# Patient Record
Sex: MALE | Race: WHITE | NOT HISPANIC OR LATINO
[De-identification: names, ages, dates, MRNs, and addresses within clinical notes are randomized per-mention and may not be internally consistent; named-entity substitution may affect disease eponyms.]

---

## 2022-08-16 ENCOUNTER — TRANSCRIPTION ENCOUNTER (OUTPATIENT)
Age: 37
End: 2022-08-16

## 2022-08-16 ENCOUNTER — APPOINTMENT (OUTPATIENT)
Dept: ORTHOPEDIC SURGERY | Facility: CLINIC | Age: 37
End: 2022-08-16

## 2022-08-16 VITALS — HEIGHT: 69 IN | BODY MASS INDEX: 27.4 KG/M2 | WEIGHT: 185 LBS

## 2022-08-16 DIAGNOSIS — M75.51 BURSITIS OF RIGHT SHOULDER: ICD-10-CM

## 2022-08-16 PROBLEM — Z00.00 ENCOUNTER FOR PREVENTIVE HEALTH EXAMINATION: Status: ACTIVE | Noted: 2022-08-16

## 2022-08-16 PROCEDURE — 99203 OFFICE O/P NEW LOW 30 MIN: CPT

## 2022-08-17 NOTE — PHYSICAL EXAM
[de-identified] : Right Shoulder:\par \par Constitutional:\par The patient is healthy-appearing and in no apparent distress. \par \par Cardiovascular System: \par The capillary refill is less than 2 seconds. \par \par Skin: \par There are no skin abnormalities.\par \par C-Spine/Neck:\par \par Active Range of Motion:\par Flexion				50\par Extension			60\par Lateral rotation			80  \par \par Right Shoulder: \par Inspection: \par There is no atrophy, erythema, warmth, swelling.\par There is no scapular winging.\par There is no AC prominence. \par \par Bony Palpation: \par There is no tenderness of the clavicle.\par There is no tenderness of the acromioclavicular joint.\par There is no tenderness of the greater tuberosity. \par There is no tenderness of the bicipital groove.\par  \par Soft Tissue Palpation: \par There is no tenderness of the trapezius.\par There is no tenderness of the rhomboid.\par There is no tenderness of the subacromial bursa. \par \par Active Range of Motion: \par Forward flexion- 				180 \par Abduction-					150\par External rotation at 0 degrees abduction-	80 \par Internal rotation at 0 degrees abduction-	80\par \par Passive Range of Motion: \par Forward flexion- 			180 \par Abduction-				150\par External rotation at 0 deg abduction-	80 \par Internal rotation at 0 deg abduction-	80\par \par Special Tests: \par Hawkin's  				Positive \par Neer's  				Positive \par Speed's  				Negative\par AC cross-over 			            Negative\par [place]'s  				Negative\par \par Stability: \par There is no general laxity. \par There is no anterior apprehension.\par \par Strength: \par Supraspinatus abduction 		5/5\par External rotation at 0 deg abduction 	5/5\par Internal rotation at 0 deg abduction	5/5\par Scapular elevation 			5/5 \par \par Neurological System: \par \par There is normal sensation to light touch C5-T1. \par \par Stability: \par There is no general laxity. \par \par Psychiatric: \par The patient demonstrates a normal mood and affect and is active and alert. [de-identified] : MRI as above.

## 2022-08-17 NOTE — ASSESSMENT
[FreeTextEntry1] : Discussed at length the patient MRI which is somewhat consistent with his MRI from 10 years ago.  Discussed symptoms most consistent with tendinitis of the shoulder rather than labral pathology.  Reviewed with him prior cervical MRI from 10 years ago which does reveal a prior disc herniation.  Treatment options reviewed and patient this time elects continued physical therapy and discuss if no improvement consideration to shot and ultimately if no improvement arthroscopic evaluation.

## 2022-08-17 NOTE — HISTORY OF PRESENT ILLNESS
[de-identified] : Location: Right shoulder\par Duration: 10 years - worse in the last few months\par Context: atraumatic\par Quality: dull, sore, fatigue\par Aggravating factors: lifting, overhead movements\par Associated symptoms: clicking\par Conservative treatment: N/A\par Prior studies: MRI 8/6/22 right shoulder [facility]- SLAP tear, mild cuff tendonitis

## 2022-11-30 ENCOUNTER — TRANSCRIPTION ENCOUNTER (OUTPATIENT)
Age: 37
End: 2022-11-30

## 2022-11-30 NOTE — ASU PATIENT PROFILE, ADULT - NSICDXPASTSURGICALHX_GEN_ALL_CORE_FT
PAST SURGICAL HISTORY:  H/O adenoidectomy     History of nasal septoplasty     History of tonsillectomy

## 2022-11-30 NOTE — ASU PATIENT PROFILE, ADULT - FALL HARM RISK - UNIVERSAL INTERVENTIONS
Bed in lowest position, wheels locked, appropriate side rails in place/Call bell, personal items and telephone in reach/Instruct patient to call for assistance before getting out of bed or chair/Non-slip footwear when patient is out of bed/[place] to call system/Physically safe environment - no spills, clutter or unnecessary equipment/Purposeful Proactive Rounding/Room/bathroom lighting operational, light cord in reach

## 2022-11-30 NOTE — ASU PATIENT PROFILE, ADULT - NS PREOP UNDERSTANDS INFO
No solid food or milk products after 12 mid-night 11/30/2022/ water or clear apple juice no later than 6:15am DOS/photo ID and insurance card/ comfortable clothing/ girlfriend will take him home/yes

## 2022-12-01 ENCOUNTER — RESULT REVIEW (OUTPATIENT)
Age: 37
End: 2022-12-01

## 2022-12-01 ENCOUNTER — OUTPATIENT (OUTPATIENT)
Dept: OUTPATIENT SERVICES | Facility: HOSPITAL | Age: 37
LOS: 1 days | Discharge: HOME | End: 2022-12-01

## 2022-12-01 ENCOUNTER — TRANSCRIPTION ENCOUNTER (OUTPATIENT)
Age: 37
End: 2022-12-01

## 2022-12-01 VITALS
RESPIRATION RATE: 17 BRPM | WEIGHT: 197.75 LBS | SYSTOLIC BLOOD PRESSURE: 120 MMHG | HEIGHT: 69 IN | TEMPERATURE: 98 F | DIASTOLIC BLOOD PRESSURE: 80 MMHG | OXYGEN SATURATION: 98 % | HEART RATE: 84 BPM

## 2022-12-01 VITALS
OXYGEN SATURATION: 99 % | HEART RATE: 80 BPM | RESPIRATION RATE: 12 BRPM | SYSTOLIC BLOOD PRESSURE: 130 MMHG | DIASTOLIC BLOOD PRESSURE: 90 MMHG

## 2022-12-01 DIAGNOSIS — Z98.890 OTHER SPECIFIED POSTPROCEDURAL STATES: Chronic | ICD-10-CM

## 2022-12-01 DIAGNOSIS — Z90.89 ACQUIRED ABSENCE OF OTHER ORGANS: Chronic | ICD-10-CM

## 2022-12-01 PROCEDURE — 88304 TISSUE EXAM BY PATHOLOGIST: CPT | Mod: 26

## 2022-12-01 PROCEDURE — 88311 DECALCIFY TISSUE: CPT | Mod: 26

## 2022-12-01 PROCEDURE — 88305 TISSUE EXAM BY PATHOLOGIST: CPT | Mod: 26

## 2022-12-01 RX ORDER — ONDANSETRON 8 MG/1
4 TABLET, FILM COATED ORAL ONCE
Refills: 0 | Status: DISCONTINUED | OUTPATIENT
Start: 2022-12-01 | End: 2022-12-01

## 2022-12-01 RX ORDER — LISDEXAMFETAMINE DIMESYLATE 70 MG/1
1 CAPSULE ORAL
Qty: 0 | Refills: 0 | DISCHARGE

## 2022-12-01 RX ORDER — FENTANYL CITRATE 50 UG/ML
50 INJECTION INTRAVENOUS
Refills: 0 | Status: DISCONTINUED | OUTPATIENT
Start: 2022-12-01 | End: 2022-12-01

## 2022-12-01 RX ORDER — FENTANYL CITRATE 50 UG/ML
25 INJECTION INTRAVENOUS
Refills: 0 | Status: DISCONTINUED | OUTPATIENT
Start: 2022-12-01 | End: 2022-12-01

## 2022-12-01 RX ORDER — ACETAMINOPHEN 500 MG
1000 TABLET ORAL ONCE
Refills: 0 | Status: DISCONTINUED | OUTPATIENT
Start: 2022-12-01 | End: 2022-12-01

## 2022-12-01 RX ORDER — SODIUM CHLORIDE 9 MG/ML
1000 INJECTION, SOLUTION INTRAVENOUS
Refills: 0 | Status: DISCONTINUED | OUTPATIENT
Start: 2022-12-01 | End: 2022-12-01

## 2022-12-01 RX ADMIN — SODIUM CHLORIDE 100 MILLILITER(S): 9 INJECTION, SOLUTION INTRAVENOUS at 13:03

## 2022-12-01 NOTE — BRIEF OPERATIVE NOTE - NSICDXBRIEFPROCEDURE_GEN_ALL_CORE_FT
PROCEDURES:  Endoscopic anterior ethmoidectomy 01-Dec-2022 12:40:24  Cathy Lundberg  Maxillary antrostomy, with tissue removal 01-Dec-2022 12:40:38  Cathy Lundberg  Uvulopharyngoplasty 01-Dec-2022 12:42:42  Cathy Lundberg  Turbinate resection 01-Dec-2022 12:43:08  Cathy Lundberg

## 2022-12-01 NOTE — BRIEF OPERATIVE NOTE - NSICDXBRIEFPOSTOP_GEN_ALL_CORE_FT
POST-OP DIAGNOSIS:  Uvular edema 01-Dec-2022 12:44:01  Cathy Lundberg  Hypertrophy of nasal turbinates 01-Dec-2022 12:44:08  Cathy Lundberg  Chronic sinusitis 01-Dec-2022 12:44:14  Cathy Lundberg

## 2022-12-01 NOTE — BRIEF OPERATIVE NOTE - NSICDXBRIEFPREOP_GEN_ALL_CORE_FT
PRE-OP DIAGNOSIS:  Hypertrophy of nasal turbinates 01-Dec-2022 12:43:20  Cathy Lundberg  Uvular hypertrophy 01-Dec-2022 12:43:29  Cathy Lundberg  Chronic sinusitis 01-Dec-2022 12:43:40  Cathy Lundberg

## 2022-12-01 NOTE — ASU DISCHARGE PLAN (ADULT/PEDIATRIC) - NS MD DC FALL RISK RISK
For information on Fall & Injury Prevention, visit: [email or web address] OR  [email or web address] OR  https://www.cdc.gov/steadi/patient.html

## 2022-12-08 LAB — SURGICAL PATHOLOGY STUDY: SIGNIFICANT CHANGE UP

## 2023-10-18 PROBLEM — F90.9 ATTENTION-DEFICIT HYPERACTIVITY DISORDER, UNSPECIFIED TYPE: Chronic | Status: ACTIVE | Noted: 2022-12-01

## 2023-10-20 ENCOUNTER — LABORATORY RESULT (OUTPATIENT)
Age: 38
End: 2023-10-20

## 2023-10-20 ENCOUNTER — APPOINTMENT (OUTPATIENT)
Dept: RHEUMATOLOGY | Facility: CLINIC | Age: 38
End: 2023-10-20
Payer: COMMERCIAL

## 2023-10-20 ENCOUNTER — NON-APPOINTMENT (OUTPATIENT)
Age: 38
End: 2023-10-20

## 2023-10-20 VITALS
WEIGHT: 196 LBS | BODY MASS INDEX: 29.03 KG/M2 | HEIGHT: 69 IN | OXYGEN SATURATION: 97 % | TEMPERATURE: 98.5 F | HEART RATE: 110 BPM | SYSTOLIC BLOOD PRESSURE: 122 MMHG | DIASTOLIC BLOOD PRESSURE: 87 MMHG

## 2023-10-20 DIAGNOSIS — M25.50 PAIN IN UNSPECIFIED JOINT: ICD-10-CM

## 2023-10-20 DIAGNOSIS — K90.41 NON-CELIAC GLUTEN SENSITIVITY: ICD-10-CM

## 2023-10-20 DIAGNOSIS — Z83.438 FAMILY HISTORY OF OTHER DISORDER OF LIPOPROTEIN METABOLISM AND OTHER LIPIDEMIA: ICD-10-CM

## 2023-10-20 DIAGNOSIS — Z78.9 OTHER SPECIFIED HEALTH STATUS: ICD-10-CM

## 2023-10-20 DIAGNOSIS — Z83.49 FAMILY HISTORY OF OTHER ENDOCRINE, NUTRITIONAL AND METABOLIC DISEASES: ICD-10-CM

## 2023-10-20 PROCEDURE — 99204 OFFICE O/P NEW MOD 45 MIN: CPT | Mod: 25

## 2023-10-20 PROCEDURE — 36415 COLL VENOUS BLD VENIPUNCTURE: CPT

## 2023-10-20 RX ORDER — CLONAZEPAM 2 MG/1
TABLET ORAL
Refills: 0 | Status: ACTIVE | COMMUNITY

## 2023-10-20 RX ORDER — LISDEXAMFETAMINE DIMESYLATE 30 MG/1
30 CAPSULE ORAL
Refills: 0 | Status: ACTIVE | COMMUNITY

## 2023-10-20 RX ORDER — DEXTROAMPHETAMINE SACCHARATE, AMPHETAMINE ASPARTATE, DEXTROAMPHETAMINE SULFATE, AND AMPHETAMINE SULFATE 2.5; 2.5; 2.5; 2.5 MG/1; MG/1; MG/1; MG/1
10 TABLET ORAL
Refills: 0 | Status: ACTIVE | COMMUNITY

## 2023-10-22 LAB
25(OH)D3 SERPL-MCNC: 34.3 NG/ML
CRP SERPL-MCNC: 3 MG/L
ERYTHROCYTE [SEDIMENTATION RATE] IN BLOOD BY WESTERGREN METHOD: 12 MM/HR
VIT B12 SERPL-MCNC: 1032 PG/ML

## 2023-10-23 LAB
ANA PAT FLD IF-IMP: ABNORMAL
ANACR T: ABNORMAL
C3 SERPL-MCNC: 137 MG/DL
C4 SERPL-MCNC: 46 MG/DL

## 2023-10-27 ENCOUNTER — APPOINTMENT (OUTPATIENT)
Dept: RHEUMATOLOGY | Facility: CLINIC | Age: 38
End: 2023-10-27
Payer: COMMERCIAL

## 2023-10-27 DIAGNOSIS — R76.8 OTHER SPECIFIED ABNORMAL IMMUNOLOGICAL FINDINGS IN SERUM: ICD-10-CM

## 2023-10-27 LAB
A PHAGOCYTOPH IGG TITR SER IF: NORMAL TITER
B BURGDOR AB SER QL IA: NEGATIVE
B MICROTI IGG TITR SER: NORMAL TITER
E CHAFFEENSIS IGG TITR SER IF: NORMAL TITER

## 2023-10-27 PROCEDURE — 99212 OFFICE O/P EST SF 10 MIN: CPT | Mod: 95

## 2024-06-19 NOTE — ASU PREOP CHECKLIST - AS BP NONINV METHOD
Anatomic Location From Referring Provider: Left proximal dorsal forearm Name Of The Referring Provider For Procedure: Dr. Yazan Beard ([facility], [place], [place]) Incorporate Mauc In Note: Yes Size Of Lesion: 6.5 X Size Of Lesion In Cm (Optional): 4.5 Other Medications Requiring Management: (per import from pharmacy; unable to verify with patient) Other Plan: Refer to [facility] Detail Level: Detailed electronic

## (undated) DEVICE — DRSG TELFA 3 X 8

## (undated) DEVICE — TUBING DIEGO DECLOG

## (undated) DEVICE — SHAVER BLADE OLYMPUS DIEGO ELITE 4MM

## (undated) DEVICE — GLV 6.5 PROTEXIS (WHITE)

## (undated) DEVICE — PACK RHINOPLASTY

## (undated) DEVICE — LENS CLEANER TUBESET STD

## (undated) DEVICE — PETRI DISH MED 3.5"

## (undated) DEVICE — SPHERE MARKER FOR BRAIN LAB IMAGE (3 SPHERES)

## (undated) DEVICE — DRAPE MAYO STAND 23"

## (undated) DEVICE — SLV COMPRESSION KNEE MED

## (undated) DEVICE — SUT CHROMIC 4-0 18" G-3

## (undated) DEVICE — ELCTR BOVIE SUCTION 8FR 6"

## (undated) DEVICE — LENS SHEATH 0 DEG 4X180MM

## (undated) DEVICE — WARMING BLANKET LOWER ADULT

## (undated) DEVICE — SUT PLAIN GUT 4-0 18" SC-1

## (undated) DEVICE — SOL ANTI FOG

## (undated) DEVICE — DRSG SPLINT NASAL CHITOSAN DISSOLVABLE

## (undated) DEVICE — ELCTR BIPOLAR CELON PROBREATH

## (undated) DEVICE — BRAINLAB HEADBAND DISP